# Patient Record
(demographics unavailable — no encounter records)

---

## 2025-07-07 NOTE — ASSESSMENT
[FreeTextEntry1] : 63 year F with left knee OA - mild prior aspiration and injection of left knee with Claire and is doing well, minimal pain, mostly mild swelling compression knee sleeve since she has no pain, do not recommend CSI at this time. PRN fu when knee pain worsens

## 2025-07-07 NOTE — HISTORY OF PRESENT ILLNESS
[de-identified] : 07/07/2025: 64 y/o F with bilateral knee pain. Had CSI with Dr. Rangel in December and then again in March had asp and CSI. Was given celebrex from Dr. Rangel. Went to an outside ortho and was given PT rx.  + physical therapy  + stiffness

## 2025-07-07 NOTE — IMAGING
[de-identified] : LEFT KNEE EXAM Alignment: Neutral Effusion: mild Atrophy: None                                                  Stable to Varus/valgus stress Posterior Drawer Test: negative Anterior Drawer Test: Negative Knee Extension/Flexion: 0 / 120  Medial/lateral compartments Medial joint line: POS Tenderness Lateral joint line: No Tenderness Elvi test: negative  Patellofemoral joint Medial patellar facet: no tenderness Patellar grind: Negative  Tendons: Pes Anserine: No tenderness Gerdys Tubercle/ IT Band: No tenderness Quadriceps Tendon: No Tenderness patellar tendon: no Tenderness Tibial tubercle: not tenderness Calf: no Tenderness  Neurovascular exam Muscle strength: 5/5 Sensation to light touch: intact Distal pulses: 2+  [id number] Xrays of the Left Knee were taken demonstrating PF OA